# Patient Record
Sex: FEMALE | Race: WHITE | NOT HISPANIC OR LATINO | URBAN - METROPOLITAN AREA
[De-identification: names, ages, dates, MRNs, and addresses within clinical notes are randomized per-mention and may not be internally consistent; named-entity substitution may affect disease eponyms.]

---

## 2018-06-27 ENCOUNTER — OFFICE VISIT (OUTPATIENT)
Dept: URBAN - METROPOLITAN AREA CLINIC 11 | Facility: CLINIC | Age: 73
End: 2018-06-27
Payer: COMMERCIAL

## 2018-06-27 DIAGNOSIS — H52.4 PRESBYOPIA: Primary | ICD-10-CM

## 2018-06-27 PROCEDURE — 92015 DETERMINE REFRACTIVE STATE: CPT | Performed by: OPTOMETRIST

## 2018-06-27 PROCEDURE — 92012 INTRM OPH EXAM EST PATIENT: CPT | Performed by: OPTOMETRIST

## 2018-06-27 ASSESSMENT — VISUAL ACUITY
OD: 20/30
OS: 20/30

## 2018-06-27 ASSESSMENT — INTRAOCULAR PRESSURE
OS: 19
OD: 18

## 2018-08-14 ENCOUNTER — OFFICE VISIT (OUTPATIENT)
Dept: URBAN - METROPOLITAN AREA CLINIC 11 | Facility: CLINIC | Age: 73
End: 2018-08-14
Payer: COMMERCIAL

## 2018-08-14 DIAGNOSIS — E11.9 TYPE 2 DIABETES MELLITUS W/O COMPLICATION: ICD-10-CM

## 2018-08-14 PROCEDURE — 99213 OFFICE O/P EST LOW 20 MIN: CPT | Performed by: OPTOMETRIST

## 2018-08-14 RX ORDER — BIMATOPROST 0.1 MG/ML
0.01 % SOLUTION/ DROPS OPHTHALMIC
Qty: 7.5 | Refills: 3 | Status: ACTIVE
Start: 2018-08-14

## 2018-08-14 ASSESSMENT — INTRAOCULAR PRESSURE
OS: 18
OD: 18

## 2018-08-14 NOTE — IMPRESSION/PLAN
Impression: Type 2 diabetes mellitus w/o complication: L30.4. Plan: Diabetes type II: no background retinopathy, no signs of neovascularization noted. Discussed ocular and systemic benefits of blood sugar control.

## 2018-08-14 NOTE — IMPRESSION/PLAN
Impression: Ocular hypertension, bilateral: H40.053. ON OD suspect
stable IOP OU Plan: Continue Lumigan qhs OU Recommend continue art tears 2-3x/day ou.  
Stopped Zioptan qhs OU after 1 drop(swelling/redness) - she has been on Travatan(very irritating), alphagan and dorzolamide(both did not lower IOP much) We discussed SLT -  had it and did not like the procedure so she is hesitant)

orig IOP 31/30, OCT - 57/65(60?/70?), VF unreliable OU

## 2018-11-20 ENCOUNTER — OFFICE VISIT (OUTPATIENT)
Dept: URBAN - METROPOLITAN AREA CLINIC 11 | Facility: CLINIC | Age: 73
End: 2018-11-20
Payer: COMMERCIAL

## 2018-11-20 DIAGNOSIS — H40.053 OCULAR HYPERTENSION, BILATERAL: Primary | ICD-10-CM

## 2018-11-20 PROCEDURE — 99214 OFFICE O/P EST MOD 30 MIN: CPT | Performed by: OPTOMETRIST

## 2018-11-20 PROCEDURE — 92133 CPTRZD OPH DX IMG PST SGM ON: CPT | Performed by: OPTOMETRIST

## 2018-11-20 ASSESSMENT — INTRAOCULAR PRESSURE
OD: 18
OS: 18
OS: 19
OD: 19

## 2018-11-20 NOTE — IMPRESSION/PLAN
Impression: Ocular hypertension, bilateral: H40.053. ON OD suspect IOP stable OU Plan: Continue Lumigan qhs OU Recommend continue art tears 2-3x/day ou.  
Stopped Zioptan qhs OU after 1 drop(swelling/redness) - she has been on Travatan(very irritating), alphagan and dorzolamide(both did not lower IOP much) We discussed SLT -  had it and did not like the procedure so she is hesitant)

orig IOP 31/30, OCT - 53/65(57/65)(60?/70?), VF unreliable OU